# Patient Record
Sex: MALE | Race: ASIAN | NOT HISPANIC OR LATINO | URBAN - METROPOLITAN AREA
[De-identification: names, ages, dates, MRNs, and addresses within clinical notes are randomized per-mention and may not be internally consistent; named-entity substitution may affect disease eponyms.]

---

## 2018-06-02 ENCOUNTER — EMERGENCY (EMERGENCY)
Facility: HOSPITAL | Age: 63
LOS: 1 days | Discharge: HOME | End: 2018-06-02
Attending: EMERGENCY MEDICINE
Payer: COMMERCIAL

## 2018-06-02 VITALS
HEIGHT: 67 IN | WEIGHT: 132.94 LBS | TEMPERATURE: 98 F | SYSTOLIC BLOOD PRESSURE: 150 MMHG | HEART RATE: 85 BPM | OXYGEN SATURATION: 97 % | RESPIRATION RATE: 18 BRPM | DIASTOLIC BLOOD PRESSURE: 73 MMHG

## 2018-06-02 PROCEDURE — 12041 INTMD RPR N-HF/GENIT 2.5CM/<: CPT

## 2018-06-02 PROCEDURE — 99283 EMERGENCY DEPT VISIT LOW MDM: CPT

## 2018-06-02 PROCEDURE — 73130 X-RAY EXAM OF HAND: CPT | Mod: 26,LT

## 2018-06-02 PROCEDURE — 73130 X-RAY EXAM OF HAND: CPT

## 2018-06-02 PROCEDURE — 99284 EMERGENCY DEPT VISIT MOD MDM: CPT | Mod: 25

## 2018-06-02 RX ORDER — IBUPROFEN 200 MG
600 TABLET ORAL ONCE
Qty: 0 | Refills: 0 | Status: COMPLETED | OUTPATIENT
Start: 2018-06-02 | End: 2018-06-02

## 2018-06-02 RX ORDER — OXYCODONE HYDROCHLORIDE 5 MG/1
5 TABLET ORAL ONCE
Qty: 0 | Refills: 0 | Status: DISCONTINUED | OUTPATIENT
Start: 2018-06-02 | End: 2018-06-02

## 2018-06-02 RX ADMIN — Medication 600 MILLIGRAM(S): at 13:24

## 2018-06-02 RX ADMIN — Medication 1 TABLET(S): at 13:26

## 2018-06-02 NOTE — ED PROVIDER NOTE - ATTENDING CONTRIBUTION TO CARE
L index finger inj by saw 2 hrs ago.  No bony ttp elsewhere. notable for L index lac through nail and volar to mid distal phalynx w associated stellate lac.  hand consult. xr. abx.

## 2018-06-02 NOTE — ED PROVIDER NOTE - PHYSICAL EXAMINATION
NAD, VSS, Afebrile, + Left index finger tip, vertical 2cm laceration with nailbed involvement. N/V- intact, no active bleeding.

## 2018-06-02 NOTE — ED PROVIDER NOTE - CARE PLAN
Principal Discharge DX:	Open finger fracture Principal Discharge DX:	Open finger fracture  Goal:	l index distal phalynx

## 2018-06-02 NOTE — ED ADULT NURSE NOTE - OBJECTIVE STATEMENT
61 yo male presents in the ed for left 2nd finger lac- pt states that he cut it on a buzz saw. Pt is alert and oriented x4, speaking coherently. lac is 3CM- plastics at bedside to see pt. Pt able to move finger. bleeding well maintained. MD at bedside, will continue to reassess.

## 2018-06-02 NOTE — ED PROVIDER NOTE - OBJECTIVE STATEMENT
63yo male pt with PMHx of HTN, HLD, DM (FS-130s at home) c/o left non dominant index finger open Fx sent by  after x-ray. Pt stated a electric saw hit his finger while he was cutting a tree. Denies other injuries. Denies sensory changes or weakness to fingers. TD- 2years ago.

## 2023-01-11 ENCOUNTER — EMERGENCY (EMERGENCY)
Facility: HOSPITAL | Age: 68
LOS: 1 days | Discharge: HOME | End: 2023-01-11
Attending: EMERGENCY MEDICINE | Admitting: EMERGENCY MEDICINE
Payer: MEDICARE

## 2023-01-11 VITALS
HEART RATE: 60 BPM | OXYGEN SATURATION: 96 % | DIASTOLIC BLOOD PRESSURE: 78 MMHG | SYSTOLIC BLOOD PRESSURE: 141 MMHG | TEMPERATURE: 98 F | RESPIRATION RATE: 16 BRPM

## 2023-01-11 VITALS
HEART RATE: 89 BPM | DIASTOLIC BLOOD PRESSURE: 60 MMHG | TEMPERATURE: 98 F | RESPIRATION RATE: 18 BRPM | OXYGEN SATURATION: 100 % | SYSTOLIC BLOOD PRESSURE: 157 MMHG

## 2023-01-11 DIAGNOSIS — N21.1 CALCULUS IN URETHRA: ICD-10-CM

## 2023-01-11 LAB
ALBUMIN SERPL ELPH-MCNC: 4.7 G/DL — SIGNIFICANT CHANGE UP (ref 3.3–5)
ALP SERPL-CCNC: 59 U/L — SIGNIFICANT CHANGE UP (ref 40–120)
ALT FLD-CCNC: 39 U/L — SIGNIFICANT CHANGE UP (ref 4–41)
ANION GAP SERPL CALC-SCNC: 10 MMOL/L — SIGNIFICANT CHANGE UP (ref 7–14)
APPEARANCE UR: CLEAR — SIGNIFICANT CHANGE UP
AST SERPL-CCNC: 34 U/L — SIGNIFICANT CHANGE UP (ref 4–40)
BASOPHILS # BLD AUTO: 0.05 K/UL — SIGNIFICANT CHANGE UP (ref 0–0.2)
BASOPHILS NFR BLD AUTO: 0.7 % — SIGNIFICANT CHANGE UP (ref 0–2)
BILIRUB SERPL-MCNC: 0.3 MG/DL — SIGNIFICANT CHANGE UP (ref 0.2–1.2)
BILIRUB UR-MCNC: NEGATIVE — SIGNIFICANT CHANGE UP
BUN SERPL-MCNC: 8 MG/DL — SIGNIFICANT CHANGE UP (ref 7–23)
CALCIUM SERPL-MCNC: 9.3 MG/DL — SIGNIFICANT CHANGE UP (ref 8.4–10.5)
CHLORIDE SERPL-SCNC: 103 MMOL/L — SIGNIFICANT CHANGE UP (ref 98–107)
CO2 SERPL-SCNC: 27 MMOL/L — SIGNIFICANT CHANGE UP (ref 22–31)
COLOR SPEC: SIGNIFICANT CHANGE UP
CREAT SERPL-MCNC: 0.84 MG/DL — SIGNIFICANT CHANGE UP (ref 0.5–1.3)
DIFF PNL FLD: ABNORMAL
EGFR: 95 ML/MIN/1.73M2 — SIGNIFICANT CHANGE UP
EOSINOPHIL # BLD AUTO: 0.15 K/UL — SIGNIFICANT CHANGE UP (ref 0–0.5)
EOSINOPHIL NFR BLD AUTO: 2 % — SIGNIFICANT CHANGE UP (ref 0–6)
GLUCOSE SERPL-MCNC: 173 MG/DL — HIGH (ref 70–99)
GLUCOSE UR QL: NEGATIVE — SIGNIFICANT CHANGE UP
HCT VFR BLD CALC: 44 % — SIGNIFICANT CHANGE UP (ref 39–50)
HGB BLD-MCNC: 14.7 G/DL — SIGNIFICANT CHANGE UP (ref 13–17)
IANC: 4.17 K/UL — SIGNIFICANT CHANGE UP (ref 1.8–7.4)
IMM GRANULOCYTES NFR BLD AUTO: 0.3 % — SIGNIFICANT CHANGE UP (ref 0–0.9)
KETONES UR-MCNC: NEGATIVE — SIGNIFICANT CHANGE UP
LEUKOCYTE ESTERASE UR-ACNC: NEGATIVE — SIGNIFICANT CHANGE UP
LYMPHOCYTES # BLD AUTO: 2.77 K/UL — SIGNIFICANT CHANGE UP (ref 1–3.3)
LYMPHOCYTES # BLD AUTO: 36.4 % — SIGNIFICANT CHANGE UP (ref 13–44)
MCHC RBC-ENTMCNC: 30.2 PG — SIGNIFICANT CHANGE UP (ref 27–34)
MCHC RBC-ENTMCNC: 33.4 GM/DL — SIGNIFICANT CHANGE UP (ref 32–36)
MCV RBC AUTO: 90.5 FL — SIGNIFICANT CHANGE UP (ref 80–100)
MONOCYTES # BLD AUTO: 0.45 K/UL — SIGNIFICANT CHANGE UP (ref 0–0.9)
MONOCYTES NFR BLD AUTO: 5.9 % — SIGNIFICANT CHANGE UP (ref 2–14)
NEUTROPHILS # BLD AUTO: 4.17 K/UL — SIGNIFICANT CHANGE UP (ref 1.8–7.4)
NEUTROPHILS NFR BLD AUTO: 54.7 % — SIGNIFICANT CHANGE UP (ref 43–77)
NITRITE UR-MCNC: NEGATIVE — SIGNIFICANT CHANGE UP
NRBC # BLD: 0 /100 WBCS — SIGNIFICANT CHANGE UP (ref 0–0)
NRBC # FLD: 0 K/UL — SIGNIFICANT CHANGE UP (ref 0–0)
PH UR: 5 — SIGNIFICANT CHANGE UP (ref 5–8)
PLATELET # BLD AUTO: 186 K/UL — SIGNIFICANT CHANGE UP (ref 150–400)
POTASSIUM SERPL-MCNC: 4 MMOL/L — SIGNIFICANT CHANGE UP (ref 3.5–5.3)
POTASSIUM SERPL-SCNC: 4 MMOL/L — SIGNIFICANT CHANGE UP (ref 3.5–5.3)
PROT SERPL-MCNC: 7.2 G/DL — SIGNIFICANT CHANGE UP (ref 6–8.3)
PROT UR-MCNC: ABNORMAL
RBC # BLD: 4.86 M/UL — SIGNIFICANT CHANGE UP (ref 4.2–5.8)
RBC # FLD: 12.3 % — SIGNIFICANT CHANGE UP (ref 10.3–14.5)
RBC CASTS # UR COMP ASSIST: SIGNIFICANT CHANGE UP /HPF (ref 0–4)
SODIUM SERPL-SCNC: 140 MMOL/L — SIGNIFICANT CHANGE UP (ref 135–145)
SP GR SPEC: 1.01 — LOW (ref 1.01–1.05)
UROBILINOGEN FLD QL: SIGNIFICANT CHANGE UP
WBC # BLD: 7.61 K/UL — SIGNIFICANT CHANGE UP (ref 3.8–10.5)
WBC # FLD AUTO: 7.61 K/UL — SIGNIFICANT CHANGE UP (ref 3.8–10.5)
WBC UR QL: 2 /HPF — SIGNIFICANT CHANGE UP (ref 0–5)

## 2023-01-11 PROCEDURE — 74176 CT ABD & PELVIS W/O CONTRAST: CPT | Mod: 26,MA

## 2023-01-11 PROCEDURE — 99285 EMERGENCY DEPT VISIT HI MDM: CPT | Mod: FS

## 2023-01-11 PROCEDURE — 99282 EMERGENCY DEPT VISIT SF MDM: CPT

## 2023-01-11 RX ORDER — KETOROLAC TROMETHAMINE 30 MG/ML
15 SYRINGE (ML) INJECTION ONCE
Refills: 0 | Status: DISCONTINUED | OUTPATIENT
Start: 2023-01-11 | End: 2023-01-11

## 2023-01-11 RX ORDER — OXYCODONE HYDROCHLORIDE 5 MG/1
1 TABLET ORAL
Qty: 8 | Refills: 0
Start: 2023-01-11 | End: 2023-01-12

## 2023-01-11 RX ORDER — SODIUM CHLORIDE 9 MG/ML
1000 INJECTION INTRAMUSCULAR; INTRAVENOUS; SUBCUTANEOUS ONCE
Refills: 0 | Status: COMPLETED | OUTPATIENT
Start: 2023-01-11 | End: 2023-01-11

## 2023-01-11 RX ORDER — OXYCODONE HYDROCHLORIDE 5 MG/1
5 TABLET ORAL ONCE
Refills: 0 | Status: DISCONTINUED | OUTPATIENT
Start: 2023-01-11 | End: 2023-01-11

## 2023-01-11 RX ADMIN — Medication 15 MILLIGRAM(S): at 16:50

## 2023-01-11 RX ADMIN — OXYCODONE HYDROCHLORIDE 5 MILLIGRAM(S): 5 TABLET ORAL at 10:21

## 2023-01-11 RX ADMIN — SODIUM CHLORIDE 1000 MILLILITER(S): 9 INJECTION INTRAMUSCULAR; INTRAVENOUS; SUBCUTANEOUS at 09:52

## 2023-01-11 RX ADMIN — OXYCODONE HYDROCHLORIDE 5 MILLIGRAM(S): 5 TABLET ORAL at 11:21

## 2023-01-11 NOTE — CONSULT NOTE ADULT - PROBLEM SELECTOR RECOMMENDATION 9
Pt reports relief with toradol  Pt is stable, afebrile, emptying adequately, and has a negative UA for infection  Pt can be  d/c'ed  on pain control and follow up with his own Urologist and try to get earlier OR date.  Pt advised to return to ER if unable to void, worsening pain, or fevers.  D/W Dr Diaz

## 2023-01-11 NOTE — ED PROVIDER NOTE - OBJECTIVE STATEMENT
66 yo M, smoker with PMH of NIDDM T2, HLD, s/p Ureteroscopy w/ bladder stone (pending lithotripsy) presents to ED c/o worsening lower abdominal pain since Sunday. Reports having intermittent pain a/w dysuria, worsening, unable to tolerate, taking Tylenol w/o relief. Admits he had abdominal pain and painful urination since mid December, seen by urologist with scope, taking Flomax, Ditropan, pending procedure on Feb 3. 68 yo M, smoker with PMH of NIDDM T2, HLD, s/p Ureteroscopy w/ bladder stone (pending lithotripsy) presents to ED c/o worsening lower abdominal pain since Sunday. Reports having intermittent pain a/w dysuria, worsening, unable to tolerate, taking Tylenol w/o relief. Admits he had abdominal pain and painful urination since mid December, seen by urologist with scope, taking Flomax, Ditropan, pending procedure on Feb 3.  Attending - Agree with above.  I evaluated patient myself. 68 yo M, smoker with PMH of NIDDM T2, HLD, s/p Ureteroscopy w/ bladder stone (pending lithotripsy) presents to ED c/o worsening lower abdominal pain since Sunday. Reports having intermittent pain a/w dysuria, worsening, unable to tolerate, taking Tylenol w/o relief. Admits he had abdominal pain and painful urination since mid December, seen by urologist with scope, taking Flomax, Ditropan, pending procedure on Feb 3.  Attending - Agree with above.  I evaluated patient myself.  67-year-old male with past medical history as noted including diabetes and history of bladder lift surgery. reports lower abdominal pain for past couple of months, worse with urination.  Reports was found to have bladder stones upon work-up by urologist Dr. Tod Schroeder at [facility].  Scheduled for bladder stone litholapaxy.  On February 3.  Patient has been taking Tylenol for pain, however getting insufficient relief overnight, so to ED for evaluation.  Denies back pain or hematuria.  Denies new type of abdominal pain.  No fever, nausea, vomiting. 66 yo M, smoker with PMH of NIDDM T2, HLD, s/p Ureteroscopy w/ bladder stone (pending litholapaxy) presents to ED c/o worsening lower abdominal pain since Sunday. Reports having intermittent pain a/w dysuria, worsening, unable to tolerate, taking Tylenol w/o relief. Admits he had abdominal pain and painful urination since mid December, seen by urologist with scope, taking Flomax, Ditropan, pending procedure on Feb 3.  Attending - Agree with above.  I evaluated patient myself.  67-year-old male with past medical history as noted including diabetes and history of bladder lift surgery. reports lower abdominal pain for past couple of months, worse with urination.  Reports was found to have bladder stones upon work-up by urologist Dr. Tod Schroeder at [facility].  Scheduled for bladder stone litholapaxy.  On February 3.  Patient has been taking Tylenol for pain, however getting insufficient relief overnight, so to ED for evaluation.  Denies back pain or hematuria.  Denies new type of abdominal pain.  No fever, nausea, vomiting.

## 2023-01-11 NOTE — ED PROVIDER NOTE - PHYSICAL EXAMINATION
ATTENDING PHYSICAL EXAM  GEN - NAD; well appearing; A+O x3  HEAD - NC/AT; EYES/NOSE - PERRL, EOMI, mucous membranes moist, no discharge; THROAT: Oral cavity and pharynx normal. No inflammation, swelling, exudate, or lesions  NECK: Neck supple, non-tender without lymphadenopathy, no masses, no JVD  PULMONARY - CTA b/l, symmetric breath sounds, no w/r/r  CARDIAC -s1s2, RRR, no M,R,G  ABDOMEN - +NABS, ND, mild lower abd TTP, soft, no guarding, no rebound, no masses   BACK - no CVA tenderness, No vertebral or paravertebral tenderness  EXTREMITIES - symmetric pulses, 2+ dp, capillary refill < 2 seconds, no clubbing, no cyanosis, no edema

## 2023-01-11 NOTE — ED ADULT TRIAGE NOTE - CHIEF COMPLAINT QUOTE
Pt with bladder stone is scheduled to have bladder lithotripsy 2/3, but states lower abdominal pain over bladder area  is unbearable. fis 168

## 2023-01-11 NOTE — ED PROVIDER NOTE - NSFOLLOWUPINSTRUCTIONS_ED_ALL_ED_FT
Follow up with your Urologist or Urology Dr. Diaz  Take Motrin 600mg every 8hrs with food for pain.      Take Oxycodone 5mg every 8 hours as needed for severe pain, do not drive caution drowsiness.    Follow up with Pulmonology for granuloma on CT.    Worsening, continued or new concerning symptoms return to the emergency department.

## 2023-01-11 NOTE — CONSULT NOTE ADULT - SUBJECTIVE AND OBJECTIVE BOX
HPI:  67-year-old male with past medical history DM, HTN, HLD  and s/p UroLift surgery in  reports lower abdominal pain for past couple of months, worse with urination.  He underwent work up by his Urologist, Dr. Tod Schroeder at [facility], and was scheduled  for cystolitholapaxy on 2/3/23.  Over the past 36 - 48 h his discomfort has been increasing.  Patient has been taking Tylenol for pain with inadequate relief overnight, so comes to [facility] ED for evaluation.  Denies flank pain, hematuria, incomplete emptying, fever, nausea, vomiting.    PAST MEDICAL & SURGICAL HISTORY:    Diabetes    HLD (hyperlipidemia)    HTN    Urolift -2020        MEDICATIONS  (STANDING):    Ramipril 5mg  Oxybutynin  Simvastatin 20mg  Janumet   Flomax 0.4mg      FAMILY HISTORY:      Allergies    No Known Allergies      SOCIAL HISTORY:  + smoker    REVIEW OF SYSTEMS: Otherwise negative as stated in HPI      Vital Signs Last 24 Hrs  T(C): 36.5 (2023 14:24), Max: 36.8 (2023 08:44)  T(F): 97.7 (2023 14:24), Max: 98.2 (2023 08:44)  HR: 56 (2023 14:24) (56 - 89)  BP: 111/65 (2023 14:24) (111/65 - 157/60)  BP(mean): --  RR: 16 (2023 14:24) (16 - 18)  SpO2: 100% (2023 14:24) (100% - 100%)    Parameters below as of 2023 08:44  Patient On (Oxygen Delivery Method): room air        PHYSICAL EXAM:    General: [x ] Awake and Alert in no acute distress    Respiratory and Thorax: [ x ] no resp distress   	  Cardiovascular: [x ] S1,S2 Reg Rate    Gastrointestinal: [x ]soft  [x ] non tender, CVAT [ ]Y  [x ]N  /    [ ]L  [ ]R     Genitourinary: Glans Circumcised [x ]Y  [ ]N, Meatus Discharge [ ]Y  [x ] N,  Blood [ ]Y [x ] N, No penile tenderness palpated                               Testes  Descended [x ]Y  [ ]N,    Tender [ ]Y  [x ]N,     Musculoskeletal / Extremities:  Edema [ ]Y [x ]N,  AROM x 4 [x ]Y  [ ]N  	      Bladder  Scan /  PVR  performed = 50cc after void ~65cc    LABS:                        14.7   7.61  )-----------( 186      ( 2023 10:08 )             44.0     -11    140  |  103  |  8   ----------------------------<  173<H>  4.0   |  27  |  0.84    Ca    9.3      2023 10:08    TPro  7.2  /  Alb  4.7  /  TBili  0.3  /  DBili  x   /  AST  34  /  ALT  39  /  AlkPhos  59        Urinalysis Basic - ( 2023 10:57 )    Color: Light Yellow / Appearance: Clear / S.008 / pH: x  Gluc: x / Ketone: Negative  / Bili: Negative / Urobili: <2 mg/dL   Blood: x / Protein: Trace / Nitrite: Negative   Leuk Esterase: Negative / RBC: 5-10 /HPF / WBC 2 /HPF   Sq Epi: x / Non Sq Epi: x / Bacteria: x      URINE CX:  none    RADIOLOGY:    < from: CT Abdomen and Pelvis No Cont (23 @ 12:59) >    ACC: [id number] EXAM:  CT ABDOMEN AND PELVIS                          PROCEDURE DATE:  2023          INTERPRETATION:  CLINICAL INFORMATION: Recent ureteroscopy with bladder   stone. Worsening abdominal pain, dysuria.    COMPARISON: None.    CONTRAST/COMPLICATIONS:  IV Contrast: None  Oral Contrast: None  Complications: None reported    PROCEDURE:  CT of the Abdomen and Pelvis was performed in the prone position.  Sagittal and coronal reformats were performed.    FINDINGS:  LOWER CHEST: Calcified granuloma in the lingula.    LIVER: Within normal limits.  BILE DUCTS: Normal caliber.  GALLBLADDER: Within normal limits.  SPLEEN: Within normal limits.  PANCREAS: Within normal limits.  ADRENALS: Within normal limits.  KIDNEYS/URETERS: No renal stones or hydronephrosis.    BLADDER: Within normal limits.  REPRODUCTIVE ORGANS: Prostate is enlarged. Metallic densities within   prostate suggestive of Urolift procedure. 9 mm calcification in the   region of the membranous urethra versus prostatic apex, concerning for   urethral calculus.    BOWEL: No bowel obstruction. Appendix is normal. Small periampullary   duodenal diverticulum.  PERITONEUM: No ascites.  VESSELS: Atherosclerotic changes.  RETROPERITONEUM/LYMPH NODES: No lymphadenopathy.  ABDOMINAL WALL: Within normal limits.  BONES: Within normal limits.    IMPRESSION:  9 mm calcification in the region of the membranous urethra versus   prostatic apex, concerning for urethral calculus.          --- End of Report ---            WINSTON PAGAN MD; Attending Radiologist  This document has been electronically signed. 2023  1:24PM    < end of copied text >

## 2023-01-11 NOTE — ED PROVIDER NOTE - NS ED ATTENDING STATEMENT MOD
This was a shared visit with the MELVINA. I reviewed and verified the documentation and independently performed the documented:

## 2023-01-11 NOTE — ED PROVIDER NOTE - PROGRESS NOTE DETAILS
[facility] - Dr. Won called.  [phone]. [facility] - Dr. Won called.  [phone].  I spoke to him.  Knows patient.  Saw him few days ago and did scan at that time.  Was not in retention then.  Agrees with ED plan.  Stated is needs rosen, OK to place and have patient f/u with him. MITZI Vegas- received sign out. urology consulted for 9mm stone. pt feeling better s/p toradol, pain relieved. suggesting dc home with his urologist or urology Dr. Diaz. pt amenable for dc.

## 2023-01-11 NOTE — ED PROVIDER NOTE - PATIENT PORTAL LINK FT
You can access the FollowMyHealth Patient Portal offered by [facility] by registering at the following website: [email or web address]. By joining [facility]’s FollowMyHealth portal, you will also be able to view your health information using other applications (apps) compatible with our system.

## 2023-01-11 NOTE — ED PROVIDER NOTE - CLINICAL SUMMARY MEDICAL DECISION MAKING FREE TEXT BOX
68 yo M, smoker with PMH of NIDDM T2, HLD, s/p Ureteroscopy w/ bladder stone (pending lithotripsy) in mid December presents to ED c/o worsening lower abdominal pain since Sunday. well appearing male. afebrile. pain likely 2/2 bladder stone, suspicious for cystitis. Plan: labs, Ua, Urine culture, IVF for hydration, pain control and imaging including US/CT A/P, and reassess. 66 yo M, smoker with PMH of NIDDM T2, HLD, s/p Ureteroscopy w/ bladder stone (pending lithotripsy) in mid December presents to ED c/o worsening lower abdominal pain since Sunday. well appearing male. afebrile. pain likely 2/2 bladder stone, suspicious for cystitis. Plan: labs, Ua, Urine culture, IVF for hydration, pain control and imaging including CT A/P, and reassess. 66 yo M, smoker with PMH of NIDDM T2, HLD, s/p Ureteroscopy w/ bladder stone (pending litholapaxy) in mid December presents to ED c/o worsening lower abdominal pain since Sunday. well appearing male. afebrile. pain likely 2/2 bladder stone, suspicious for cystitis. Plan: labs, Ua, Urine culture, IVF for hydration, pain control and imaging including CT A/P, and reassess.

## 2023-01-11 NOTE — ED ADULT NURSE NOTE - OBJECTIVE STATEMENT
Left message for patient to call back to schedule Colonoscopy procedure. Per RP message below.     Maik Melgar, [facility] March 16, 2021 Patient received in stretcher. AOX4. Respirations even and unlabored. Spontaneous movement of all extremities noted. Presents to ER c/o pain on urination x1 month worsening yesterday. Patient reports taking Tylenol at home with minimal relief. As per patient he was diagnosed with a stone recently but it hasn't passed. Iv placed. Labs drawn. Medicated as per MD orders. Comfort and safety maintained. All current care needs met. Care plan continued Kelly DOMINGUEZ

## 2023-01-11 NOTE — ED ADULT NURSE REASSESSMENT NOTE - NS ED NURSE REASSESS COMMENT FT1
Break coverage RN: pt is a&ox4, breathing spontaneously and nonlabored, no acute distress in appearance. Pt ambulatory to bathroom at this time, awaiting ct scan. Pt resting comfortably in bed, awaiting ct scan. Safety precautions in place and to be maintained. Will continue to monitor.

## 2023-01-12 LAB
CULTURE RESULTS: SIGNIFICANT CHANGE UP
SPECIMEN SOURCE: SIGNIFICANT CHANGE UP

## 2024-09-25 NOTE — ED ADULT TRIAGE NOTE - RESPIRATORY RATE (BREATHS/MIN)
----- Message from Tamiko Cortez sent at 9/25/2024  4:43 PM CDT -----   Name of Who is Calling:     What is the request in detail: patient request call back in reference to ER follow up appointment  Please contact to further discuss and advise      Can the clinic reply by [facility]:     What Number to Call Back if not in [facility]:  [phone]  
Phone pt schedule  
[phone]